# Patient Record
Sex: FEMALE | Race: WHITE
[De-identification: names, ages, dates, MRNs, and addresses within clinical notes are randomized per-mention and may not be internally consistent; named-entity substitution may affect disease eponyms.]

---

## 2017-07-06 ENCOUNTER — HOSPITAL (OUTPATIENT)
Dept: OTHER | Age: 22
End: 2017-07-06
Attending: INTERNAL MEDICINE

## 2017-11-30 ENCOUNTER — HOSPITAL (OUTPATIENT)
Dept: OTHER | Age: 22
End: 2017-11-30
Attending: EMERGENCY MEDICINE

## 2017-11-30 LAB
CHLAMYDIA PROBE: NEGATIVE
CHLAMYDIA/GC SOURCE: NORMAL
CONTROL LINE: PRESENT
CONTROL LINE: PRESENT
GC PROBE: NEGATIVE
Lab: CLEAR
Lab: CLEAR
N GON SOURCE: NORMAL
TRICH POC: NORMAL
URINE PREG POC: NEGATIVE

## 2018-02-28 ENCOUNTER — HOSPITAL (OUTPATIENT)
Dept: OTHER | Age: 23
End: 2018-02-28
Attending: FAMILY MEDICINE

## 2018-02-28 LAB
UA APPEAR POC: CLEAR
UA BILI POC: NEGATIVE
UA BLOOD POC: ABNORMAL
UA COLOR POC: YELLOW
UA GLUCOSE POC: NEGATIVE
UA KETONES POC: NEGATIVE
UA LEUK EST POC: ABNORMAL
UA NITRITE POC: NEGATIVE
UA PH POC: 5.5 (ref 5–7)
UA PROTEIN POC: NEGATIVE
UA SPEC GRAV POC: 1 (ref 1.01–1.02)
UA UROBILIN POC: 0.2 MG/DL

## 2018-03-06 ENCOUNTER — HOSPITAL (OUTPATIENT)
Dept: OTHER | Age: 23
End: 2018-03-06
Attending: INTERNAL MEDICINE

## 2018-03-06 LAB
CONTROL LINE: PRESENT
Lab: CLEAR
UA APPEAR POC: CLEAR
UA BILI POC: NEGATIVE
UA BLOOD POC: ABNORMAL
UA COLOR POC: YELLOW
UA GLUCOSE POC: NEGATIVE
UA KETONES POC: NEGATIVE
UA LEUK EST POC: NEGATIVE
UA NITRITE POC: NEGATIVE
UA PH POC: 5.5 (ref 5–7)
UA PROTEIN POC: NEGATIVE
UA SPEC GRAV POC: 1 (ref 1.01–1.02)
UA UROBILIN POC: 0.2 MG/DL
URINE PREG POC: NEGATIVE

## 2018-04-09 ENCOUNTER — HOSPITAL (OUTPATIENT)
Dept: OTHER | Age: 23
End: 2018-04-09
Attending: FAMILY MEDICINE

## 2018-04-09 LAB
CONTROL LINE: PRESENT
Lab: CLEAR
UA APPEAR POC: CLEAR
UA BILI POC: NEGATIVE
UA BLOOD POC: ABNORMAL
UA COLOR POC: ABNORMAL
UA GLUCOSE POC: ABNORMAL
UA KETONES POC: NEGATIVE
UA LEUK EST POC: NEGATIVE
UA NITRITE POC: POSITIVE
UA PH POC: 6.5 (ref 5–7)
UA PROTEIN POC: ABNORMAL
UA SPEC GRAV POC: 1.01 (ref 1.01–1.02)
UA UROBILIN POC: 1 MG/DL
URINE PREG POC: NEGATIVE

## 2018-04-20 ENCOUNTER — HOSPITAL (OUTPATIENT)
Dept: OTHER | Age: 23
End: 2018-04-20
Attending: UROLOGY

## 2018-05-12 LAB
ANION GAP SERPL CALC-SCNC: 13 MEQ/L (ref 10–20)
BUN SERPL-MCNC: 8 MG/DL (ref 6–20)
CALCIUM: 10.1 MG/DL (ref 8.4–10.2)
CHLORIDE: 104 MEQ/L (ref 97–107)
CREATININE: 0.82 MG/DL (ref 0.6–1.3)
GLUCOSE LVL: 85 MG/DL (ref 70–99)
HEMOLYSIS 2+: NORMAL
POTASSIUM: 4.1 MEQ/L (ref 3.5–5.1)
SODIUM: 138 MEQ/L (ref 136–145)
TCO2: 25 MEQ/L (ref 19–29)

## 2018-05-15 ENCOUNTER — HOSPITAL (OUTPATIENT)
Dept: OTHER | Age: 23
End: 2018-05-15
Attending: UROLOGY

## 2018-05-15 LAB
CONTROL LINE: PRESENT
Lab: CLEAR
URINE PREG POC: NEGATIVE

## 2018-12-08 ENCOUNTER — HOSPITAL ENCOUNTER (OUTPATIENT)
Dept: ULTRASOUND IMAGING | Age: 23
Discharge: HOME | End: 2018-12-08
Attending: INTERNAL MEDICINE
Payer: COMMERCIAL

## 2018-12-08 DIAGNOSIS — E28.2 PCO (POLYCYSTIC OVARIES): ICD-10-CM

## 2018-12-08 PROCEDURE — 76830 TRANSVAGINAL US NON-OB: CPT | Performed by: INTERNAL MEDICINE

## 2018-12-08 PROCEDURE — 76856 US EXAM PELVIC COMPLETE: CPT | Performed by: INTERNAL MEDICINE

## 2019-01-21 ENCOUNTER — OFFICE VISIT (OUTPATIENT)
Dept: FAMILY MEDICINE CLINIC | Facility: CLINIC | Age: 24
End: 2019-01-21
Payer: COMMERCIAL

## 2019-01-21 VITALS
HEIGHT: 64 IN | DIASTOLIC BLOOD PRESSURE: 74 MMHG | BODY MASS INDEX: 22.77 KG/M2 | HEART RATE: 64 BPM | SYSTOLIC BLOOD PRESSURE: 109 MMHG | WEIGHT: 133.38 LBS

## 2019-01-21 DIAGNOSIS — E28.2 PCOS (POLYCYSTIC OVARIAN SYNDROME): ICD-10-CM

## 2019-01-21 DIAGNOSIS — Z00.00 ROUTINE MEDICAL EXAM: Primary | ICD-10-CM

## 2019-01-21 PROCEDURE — 99385 PREV VISIT NEW AGE 18-39: CPT | Performed by: FAMILY MEDICINE

## 2019-01-21 RX ORDER — SPIRONOLACTONE 100 MG/1
1 TABLET, FILM COATED ORAL DAILY
Refills: 1 | COMMUNITY
Start: 2018-12-18

## 2019-01-21 RX ORDER — FLUOXETINE 10 MG/1
1 TABLET, FILM COATED ORAL DAILY
Refills: 2 | COMMUNITY
Start: 2019-01-02

## 2019-01-21 NOTE — PROGRESS NOTES
HPI:   Gal Clark is a 20year old female who presents for a complete physical exam.    Has diagnosis of PCOS - sees endocrinologist. Leisa Fat on it for over a year. Has menses more regularly. LMP 1/14/2019.    Taking fluoxetine for week before and during for or itching,irregular menses  MUSCULOSKELETAL: denies back pain  NEURO: denies headaches  PSYCHE: denies depression or anxiety  HEMATOLOGIC: denies hx of anemia or easy bruising  ENDOCRINE: denies weight changes  ALL/ASTHMA: denies hx of allergy or asthma

## 2019-03-16 ENCOUNTER — TELEPHONE (OUTPATIENT)
Dept: OTHER | Age: 24
End: 2019-03-16

## 2019-03-16 DIAGNOSIS — Z00.00 ROUTINE GENERAL MEDICAL EXAMINATION AT A HEALTH CARE FACILITY: Primary | ICD-10-CM

## 2019-03-16 NOTE — TELEPHONE ENCOUNTER
Pt states she needs to have lab work done, ordered at 1/21/19 OV. Pt would like to have orders for [facility], she states that lab is closer to her.  Informed pt orders will be rewritten for [facility].

## 2019-03-25 LAB
% SATURATION: 10 % (CALC) (ref 11–50)
ABSOLUTE BASOPHILS: 32 CELLS/UL (ref 0–200)
ABSOLUTE EOSINOPHILS: 102 CELLS/UL (ref 15–500)
ABSOLUTE LYMPHOCYTES: 3053 CELLS/UL (ref 850–3900)
ABSOLUTE MONOCYTES: 602 CELLS/UL (ref 200–950)
ABSOLUTE NEUTROPHILS: 2611 CELLS/UL (ref 1500–7800)
ALBUMIN/GLOBULIN RATIO: 1.6 (CALC) (ref 1–2.5)
ALBUMIN: 4.6 G/DL (ref 3.6–5.1)
ALKALINE PHOSPHATASE: 81 U/L (ref 33–115)
ALT: 13 U/L (ref 6–29)
APPEARANCE: CLEAR
AST: 18 U/L (ref 10–30)
BASOPHILS: 0.5 %
BILIRUBIN, TOTAL: 0.3 MG/DL (ref 0.2–1.2)
BILIRUBIN: NEGATIVE
BUN: 11 MG/DL (ref 7–25)
CALCIUM: 9.2 MG/DL (ref 8.6–10.2)
CARBON DIOXIDE: 25 MMOL/L (ref 20–32)
CHLORIDE: 104 MMOL/L (ref 98–110)
CHOL/HDLC RATIO: 2.5 (CALC)
CHOLESTEROL, TOTAL: 158 MG/DL
COLOR: YELLOW
CREATININE: 0.85 MG/DL (ref 0.5–1.1)
EGFR IF AFRICN AM: 110 ML/MIN/1.73M2
EGFR IF NONAFRICN AM: 95 ML/MIN/1.73M2
EOSINOPHILS: 1.6 %
GLOBULIN: 2.9 G/DL (CALC) (ref 1.9–3.7)
GLUCOSE: 78 MG/DL (ref 65–99)
GLUCOSE: NEGATIVE
HDL CHOLESTEROL: 63 MG/DL
HEMATOCRIT: 39.2 % (ref 35–45)
HEMOGLOBIN: 13.2 G/DL (ref 11.7–15.5)
IRON BINDING CAPACITY: 404 MCG/DL (CALC) (ref 250–450)
IRON, TOTAL: 41 MCG/DL (ref 40–190)
KETONES: NEGATIVE
LDL-CHOLESTEROL: 78 MG/DL (CALC)
LYMPHOCYTES: 47.7 %
MCH: 29.5 PG (ref 27–33)
MCHC: 33.7 G/DL (ref 32–36)
MCV: 87.7 FL (ref 80–100)
MONOCYTES: 9.4 %
MPV: 10.3 FL (ref 7.5–12.5)
NEUTROPHILS: 40.8 %
NITRITE: NEGATIVE
NON-HDL CHOLESTEROL: 95 MG/DL (CALC)
OCCULT BLOOD: NEGATIVE
PH: 7.5 (ref 5–8)
PLATELET COUNT: 312 THOUSAND/UL (ref 140–400)
POTASSIUM: 4.3 MMOL/L (ref 3.5–5.3)
PROTEIN, TOTAL: 7.5 G/DL (ref 6.1–8.1)
PROTEIN: NEGATIVE
RDW: 12.2 % (ref 11–15)
RED BLOOD CELL COUNT: 4.47 MILLION/UL (ref 3.8–5.1)
SODIUM: 136 MMOL/L (ref 135–146)
SPECIFIC GRAVITY: 1 (ref 1–1.03)
TRIGLYCERIDES: 89 MG/DL
VITAMIN B12: 301 PG/ML (ref 200–1100)
VITAMIN D, 25-OH, TOTAL: 43 NG/ML (ref 30–100)
WHITE BLOOD CELL COUNT: 6.4 THOUSAND/UL (ref 3.8–10.8)

## 2019-04-01 NOTE — PROGRESS NOTES
[facility] - Overall the labs looked great. Urine was normal - no blood this time.  Cholesterol and vitamin levels were normal. - Dr. Randall Ferro

## 2019-10-10 ENCOUNTER — OFFICE VISIT (OUTPATIENT)
Dept: FAMILY MEDICINE CLINIC | Facility: CLINIC | Age: 24
End: 2019-10-10
Payer: COMMERCIAL

## 2019-10-10 VITALS
WEIGHT: 133 LBS | DIASTOLIC BLOOD PRESSURE: 71 MMHG | HEIGHT: 64 IN | SYSTOLIC BLOOD PRESSURE: 109 MMHG | HEART RATE: 55 BPM | BODY MASS INDEX: 22.71 KG/M2

## 2019-10-10 DIAGNOSIS — Z29.8 ALTITUDE SICKNESS PREVENTATIVE MEASURES: Primary | ICD-10-CM

## 2019-10-10 PROCEDURE — 99213 OFFICE O/P EST LOW 20 MIN: CPT | Performed by: FAMILY MEDICINE

## 2019-10-10 RX ORDER — ACETAZOLAMIDE 125 MG/1
125 TABLET ORAL 2 TIMES DAILY
Qty: 20 TABLET | Refills: 0 | Status: SHIPPED | OUTPATIENT
Start: 2019-10-10 | End: 2021-02-17

## 2019-10-10 NOTE — PROGRESS NOTES
Debra Meadows is a 20year old female. Patient presents with:  Motion Sickness: medication     HPI:   Traveling to [facility]. Would like medication to prevent altitude sickness and possible medication for motion sickness. Will be on small place. Had flu shot.

## 2020-07-31 ENCOUNTER — TELEPHONE (OUTPATIENT)
Dept: FAMILY MEDICINE | Age: 25
End: 2020-07-31

## 2020-09-16 ENCOUNTER — HOSPITAL (OUTPATIENT)
Dept: OTHER | Age: 25
End: 2020-09-16
Attending: PHYSICIAN ASSISTANT

## 2020-09-16 LAB
CONTROL LINE: PRESENT
Lab: CLEAR
UA APPEAR POC: ABNORMAL
UA APPEAR POC: ABNORMAL
UA BILI POC: ABNORMAL
UA BILI POC: ABNORMAL
UA BLOOD POC: NEGATIVE
UA BLOOD POC: NEGATIVE
UA COLOR POC: ABNORMAL
UA COLOR POC: ABNORMAL
UA GLUCOSE POC: ABNORMAL
UA GLUCOSE POC: ABNORMAL
UA KETONES POC: ABNORMAL
UA KETONES POC: ABNORMAL
UA LEUK EST POC: ABNORMAL
UA LEUK EST POC: ABNORMAL
UA NITRITE POC: POSITIVE
UA NITRITE POC: POSITIVE
UA PH POC: 5 (ref 5–7)
UA PH POC: 5 (ref 5–7)
UA PROTEIN POC: ABNORMAL
UA PROTEIN POC: ABNORMAL
UA SPEC GRAV POC: 1.01 (ref 1.01–1.02)
UA SPEC GRAV POC: 1.01 (ref 1.01–1.02)
UA UROBILIN POC: 2 MG/DL
UA UROBILIN POC: 2 MG/DL
URINE PREG POC: NEGATIVE
URINE PREG POC: NEGATIVE

## 2021-01-21 ENCOUNTER — MED REC SCAN ONLY (OUTPATIENT)
Dept: FAMILY MEDICINE CLINIC | Facility: CLINIC | Age: 26
End: 2021-01-21

## 2021-02-17 ENCOUNTER — OFFICE VISIT (OUTPATIENT)
Dept: FAMILY MEDICINE CLINIC | Facility: CLINIC | Age: 26
End: 2021-02-17
Payer: COMMERCIAL

## 2021-02-17 VITALS
WEIGHT: 132.63 LBS | HEIGHT: 64 IN | BODY MASS INDEX: 22.64 KG/M2 | SYSTOLIC BLOOD PRESSURE: 116 MMHG | TEMPERATURE: 97 F | HEART RATE: 60 BPM | DIASTOLIC BLOOD PRESSURE: 72 MMHG

## 2021-02-17 DIAGNOSIS — Z00.00 ROUTINE MEDICAL EXAM: Primary | ICD-10-CM

## 2021-02-17 DIAGNOSIS — N39.0 CHRONIC UTI: ICD-10-CM

## 2021-02-17 DIAGNOSIS — Z78.9 ENGAGES IN TRAVEL ABROAD: ICD-10-CM

## 2021-02-17 PROCEDURE — 90686 IIV4 VACC NO PRSV 0.5 ML IM: CPT | Performed by: FAMILY MEDICINE

## 2021-02-17 PROCEDURE — 99395 PREV VISIT EST AGE 18-39: CPT | Performed by: FAMILY MEDICINE

## 2021-02-17 PROCEDURE — 3074F SYST BP LT 130 MM HG: CPT | Performed by: FAMILY MEDICINE

## 2021-02-17 PROCEDURE — 3008F BODY MASS INDEX DOCD: CPT | Performed by: FAMILY MEDICINE

## 2021-02-17 PROCEDURE — 90471 IMMUNIZATION ADMIN: CPT | Performed by: FAMILY MEDICINE

## 2021-02-17 PROCEDURE — 3078F DIAST BP <80 MM HG: CPT | Performed by: FAMILY MEDICINE

## 2021-02-17 RX ORDER — SULFAMETHOXAZOLE AND TRIMETHOPRIM 400; 80 MG/1; MG/1
TABLET ORAL
Qty: 20 TABLET | Refills: 1 | Status: SHIPPED | OUTPATIENT
Start: 2021-02-17

## 2021-02-17 RX ORDER — ACETAZOLAMIDE 125 MG/1
125 TABLET ORAL 2 TIMES DAILY
Qty: 20 TABLET | Refills: 0 | Status: SHIPPED | OUTPATIENT
Start: 2021-02-17 | End: 2021-02-27

## 2021-02-18 NOTE — PROGRESS NOTES
HPI:   Ronal Guzmán is a 30year old female who presents for a complete physical exam.    Going to [facility] at end of July. Would like renewal of diomax. Wanted to review her past vaccines. Had cystoscopy for chronic UTI. Had one recently. No blood.  Was g 97.4 °F (36.3 °C) (Temporal)   Ht 5' 4\" (1.626 m)   Wt 132 lb 9.6 oz (60.1 kg)   LMP 01/16/2021 (Approximate)   BMI 22.76 kg/m²     GENERAL: well developed, well nourished,in no apparent distress  SKIN: no rashes,no suspicious lesions  HEENT: atraumatic,

## 2021-07-22 ENCOUNTER — MED REC SCAN ONLY (OUTPATIENT)
Dept: FAMILY MEDICINE CLINIC | Facility: CLINIC | Age: 26
End: 2021-07-22

## 2021-08-16 ENCOUNTER — OFFICE VISIT (OUTPATIENT)
Dept: FAMILY MEDICINE CLINIC | Facility: CLINIC | Age: 26
End: 2021-08-16
Payer: COMMERCIAL

## 2021-08-16 VITALS
DIASTOLIC BLOOD PRESSURE: 55 MMHG | SYSTOLIC BLOOD PRESSURE: 90 MMHG | TEMPERATURE: 99 F | HEIGHT: 64 IN | WEIGHT: 129 LBS | HEART RATE: 71 BPM | BODY MASS INDEX: 22.02 KG/M2

## 2021-08-16 DIAGNOSIS — R30.0 DYSURIA: ICD-10-CM

## 2021-08-16 DIAGNOSIS — N30.01 ACUTE CYSTITIS WITH HEMATURIA: Primary | ICD-10-CM

## 2021-08-16 DIAGNOSIS — R35.0 URINARY FREQUENCY: ICD-10-CM

## 2021-08-16 LAB
BILIRUBIN: NEGATIVE
GLUCOSE (URINE DIPSTICK): 100 MG/DL
MULTISTIX LOT#: ABNORMAL NUMERIC
NITRITE, URINE: POSITIVE
PH, URINE: 5 (ref 4.5–8)
PROTEIN (URINE DIPSTICK): 100 MG/DL
SPECIFIC GRAVITY: 1.01 (ref 1–1.03)
UROBILINOGEN,SEMI-QN: 2 MG/DL (ref 0–1.9)

## 2021-08-16 PROCEDURE — 99214 OFFICE O/P EST MOD 30 MIN: CPT | Performed by: NURSE PRACTITIONER

## 2021-08-16 PROCEDURE — 3078F DIAST BP <80 MM HG: CPT | Performed by: NURSE PRACTITIONER

## 2021-08-16 PROCEDURE — 81003 URINALYSIS AUTO W/O SCOPE: CPT | Performed by: NURSE PRACTITIONER

## 2021-08-16 PROCEDURE — 3008F BODY MASS INDEX DOCD: CPT | Performed by: NURSE PRACTITIONER

## 2021-08-16 PROCEDURE — 3074F SYST BP LT 130 MM HG: CPT | Performed by: NURSE PRACTITIONER

## 2021-08-16 RX ORDER — NITROFURANTOIN 25; 75 MG/1; MG/1
100 CAPSULE ORAL 2 TIMES DAILY
Qty: 14 CAPSULE | Refills: 0 | Status: SHIPPED | OUTPATIENT
Start: 2021-08-16 | End: 2022-01-20

## 2021-08-16 NOTE — PROGRESS NOTES
HPI    Patient presents for urinary frequency and dysuria x 2 days. With a history of chronic uti. Take bactrim post coital for uti prophylaxis. Last dose was 2-3 days ago. Review of Systems   Genitourinary: Positive for dysuria and frequency.    Al Lack of Transportation (Non-Medical):   Physical Activity:       Days of Exercise per Week:       Minutes of Exercise per Session:   Stress:       Feeling of Stress :   Social Connections:       Frequency of Communication with Friends and Family:       Dionte Thought Content:  Thought content normal.         Judgment: Judgment normal.          Assessment and Plan:  Problem List Items Addressed This Visit     None      Visit Diagnoses     Dysuria        Relevant Orders    URINALYSIS, AUTO, W/O SCOPE    Urinary fr

## 2022-01-20 ENCOUNTER — OFFICE VISIT (OUTPATIENT)
Dept: FAMILY MEDICINE CLINIC | Facility: CLINIC | Age: 27
End: 2022-01-20
Payer: COMMERCIAL

## 2022-01-20 VITALS
HEIGHT: 64 IN | WEIGHT: 140.38 LBS | SYSTOLIC BLOOD PRESSURE: 100 MMHG | HEART RATE: 67 BPM | DIASTOLIC BLOOD PRESSURE: 62 MMHG | TEMPERATURE: 98 F | BODY MASS INDEX: 23.97 KG/M2

## 2022-01-20 DIAGNOSIS — Z00.00 ROUTINE MEDICAL EXAM: Primary | ICD-10-CM

## 2022-01-20 DIAGNOSIS — E28.2 PCOS (POLYCYSTIC OVARIAN SYNDROME): ICD-10-CM

## 2022-01-20 PROCEDURE — 3078F DIAST BP <80 MM HG: CPT | Performed by: FAMILY MEDICINE

## 2022-01-20 PROCEDURE — 99395 PREV VISIT EST AGE 18-39: CPT | Performed by: FAMILY MEDICINE

## 2022-01-20 PROCEDURE — 3074F SYST BP LT 130 MM HG: CPT | Performed by: FAMILY MEDICINE

## 2022-01-20 PROCEDURE — 3008F BODY MASS INDEX DOCD: CPT | Performed by: FAMILY MEDICINE

## 2022-01-20 NOTE — PROGRESS NOTES
HPI:   Doroteo Rosales is a 27year old female who presents for a complete physical exam.    Sees Dr. Erick Osman for PCOS. Treating. Takes spironolactone every other day. Menses is more regular with it. Exercising now but did gain weight.     Has separ depression pos anxiety - but stable.    HEMATOLOGIC: denies hx of anemia or easy bruising  ENDOCRINE: denies weight changes  ALL/ASTHMA: denies hx of allergy or asthma    EXAM:   /62   Pulse 67   Temp 98.4 °F (36.9 °C) (Temporal)   Ht 5' 4\" (1.626 m)

## 2022-03-04 ENCOUNTER — LAB ENCOUNTER (OUTPATIENT)
Dept: LAB | Age: 27
End: 2022-03-04
Attending: FAMILY MEDICINE
Payer: COMMERCIAL

## 2022-03-04 DIAGNOSIS — Z00.00 ROUTINE MEDICAL EXAM: ICD-10-CM

## 2022-03-04 DIAGNOSIS — E28.2 POLYCYSTIC OVARIES: Primary | ICD-10-CM

## 2022-03-04 LAB
ALBUMIN SERPL-MCNC: 4.1 G/DL (ref 3.4–5)
ALBUMIN/GLOB SERPL: 1.3 {RATIO} (ref 1–2)
ALP LIVER SERPL-CCNC: 73 U/L
ALT SERPL-CCNC: 22 U/L
ANION GAP SERPL CALC-SCNC: 6 MMOL/L (ref 0–18)
AST SERPL-CCNC: 21 U/L (ref 15–37)
BASOPHILS # BLD AUTO: 0.04 X10(3) UL (ref 0–0.2)
BASOPHILS NFR BLD AUTO: 0.6 %
BILIRUB SERPL-MCNC: 0.5 MG/DL (ref 0.1–2)
BUN BLD-MCNC: 11 MG/DL (ref 7–18)
BUN/CREAT SERPL: 13.3 (ref 10–20)
CALCIUM BLD-MCNC: 9.2 MG/DL (ref 8.5–10.1)
CHLORIDE SERPL-SCNC: 105 MMOL/L (ref 98–112)
CHOLEST SERPL-MCNC: 186 MG/DL (ref ?–200)
CREAT BLD-MCNC: 0.83 MG/DL
DEPRECATED RDW RBC AUTO: 40.1 FL (ref 35.1–46.3)
EOSINOPHIL # BLD AUTO: 0.31 X10(3) UL (ref 0–0.7)
EOSINOPHIL NFR BLD AUTO: 4.4 %
ERYTHROCYTE [DISTWIDTH] IN BLOOD BY AUTOMATED COUNT: 12.9 % (ref 11–15)
EST. AVERAGE GLUCOSE BLD GHB EST-MCNC: 105 MG/DL (ref 68–126)
FASTING PATIENT LIPID ANSWER: YES
FASTING STATUS PATIENT QL REPORTED: YES
GLOBULIN PLAS-MCNC: 3.1 G/DL (ref 2.8–4.4)
GLUCOSE BLD-MCNC: 81 MG/DL (ref 70–99)
HBA1C MFR BLD: 5.3 % (ref ?–5.7)
HCT VFR BLD AUTO: 38.1 %
HDLC SERPL-MCNC: 71 MG/DL (ref 40–59)
HGB BLD-MCNC: 12.4 G/DL
IMM GRANULOCYTES # BLD AUTO: 0.01 X10(3) UL (ref 0–1)
IMM GRANULOCYTES NFR BLD: 0.1 %
LDLC SERPL CALC-MCNC: 100 MG/DL (ref ?–100)
LYMPHOCYTES # BLD AUTO: 2.76 X10(3) UL (ref 1–4)
LYMPHOCYTES NFR BLD AUTO: 38.8 %
MCH RBC QN AUTO: 27.9 PG (ref 26–34)
MCHC RBC AUTO-ENTMCNC: 32.5 G/DL (ref 31–37)
MCV RBC AUTO: 85.8 FL
MONOCYTES # BLD AUTO: 0.71 X10(3) UL (ref 0.1–1)
MONOCYTES NFR BLD AUTO: 10 %
NEUTROPHILS # BLD AUTO: 3.28 X10 (3) UL (ref 1.5–7.7)
NEUTROPHILS # BLD AUTO: 3.28 X10(3) UL (ref 1.5–7.7)
NEUTROPHILS NFR BLD AUTO: 46.1 %
NONHDLC SERPL-MCNC: 115 MG/DL (ref ?–130)
OSMOLALITY SERPL CALC.SUM OF ELEC: 282 MOSM/KG (ref 275–295)
PLATELET # BLD AUTO: 315 10(3)UL (ref 150–450)
POTASSIUM SERPL-SCNC: 4.1 MMOL/L (ref 3.5–5.1)
PROT SERPL-MCNC: 7.2 G/DL (ref 6.4–8.2)
RBC # BLD AUTO: 4.44 X10(6)UL
SODIUM SERPL-SCNC: 137 MMOL/L (ref 136–145)
TRIGL SERPL-MCNC: 84 MG/DL (ref 30–149)
TSI SER-ACNC: 2.12 MIU/ML (ref 0.36–3.74)
VIT B12 SERPL-MCNC: 294 PG/ML (ref 193–986)
VIT D+METAB SERPL-MCNC: 35 NG/ML (ref 30–100)
VLDLC SERPL CALC-MCNC: 14 MG/DL (ref 0–30)
WBC # BLD AUTO: 7.1 X10(3) UL (ref 4–11)

## 2022-03-04 PROCEDURE — 85025 COMPLETE CBC W/AUTO DIFF WBC: CPT

## 2022-03-04 PROCEDURE — 36415 COLL VENOUS BLD VENIPUNCTURE: CPT

## 2022-03-04 PROCEDURE — 82306 VITAMIN D 25 HYDROXY: CPT

## 2022-03-04 PROCEDURE — 80061 LIPID PANEL: CPT

## 2022-03-04 PROCEDURE — 83036 HEMOGLOBIN GLYCOSYLATED A1C: CPT

## 2022-03-04 PROCEDURE — 82607 VITAMIN B-12: CPT

## 2022-03-04 PROCEDURE — 80053 COMPREHEN METABOLIC PANEL: CPT

## 2022-03-04 PROCEDURE — 84443 ASSAY THYROID STIM HORMONE: CPT

## 2022-03-10 NOTE — PROGRESS NOTES
Luis Fernando Dsouza - Your labs are all normal. Your B12 levels are at the lower end of normal. You can take extra vitamin B12 200-400 mcg daily to boost those levels and maybe your energy. - Dr. Jones Lilly

## 2022-05-06 ENCOUNTER — OFFICE VISIT (OUTPATIENT)
Dept: FAMILY MEDICINE CLINIC | Facility: CLINIC | Age: 27
End: 2022-05-06
Payer: COMMERCIAL

## 2022-05-06 VITALS
TEMPERATURE: 98 F | HEIGHT: 64 IN | DIASTOLIC BLOOD PRESSURE: 74 MMHG | SYSTOLIC BLOOD PRESSURE: 106 MMHG | WEIGHT: 143 LBS | HEART RATE: 76 BPM | BODY MASS INDEX: 24.41 KG/M2

## 2022-05-06 DIAGNOSIS — J01.90 ACUTE SINUSITIS, RECURRENCE NOT SPECIFIED, UNSPECIFIED LOCATION: Primary | ICD-10-CM

## 2022-05-06 DIAGNOSIS — R05.9 COUGH: ICD-10-CM

## 2022-05-06 PROCEDURE — 99213 OFFICE O/P EST LOW 20 MIN: CPT | Performed by: NURSE PRACTITIONER

## 2022-05-06 PROCEDURE — 3078F DIAST BP <80 MM HG: CPT | Performed by: NURSE PRACTITIONER

## 2022-05-06 PROCEDURE — 3074F SYST BP LT 130 MM HG: CPT | Performed by: NURSE PRACTITIONER

## 2022-05-06 PROCEDURE — 3008F BODY MASS INDEX DOCD: CPT | Performed by: NURSE PRACTITIONER

## 2022-05-06 RX ORDER — ALBUTEROL SULFATE 90 UG/1
2 AEROSOL, METERED RESPIRATORY (INHALATION) EVERY 4 HOURS PRN
Qty: 18 G | Refills: 0 | Status: SHIPPED | OUTPATIENT
Start: 2022-05-06

## 2022-05-06 RX ORDER — PREDNISONE 20 MG/1
TABLET ORAL
Qty: 10 TABLET | Refills: 0 | Status: SHIPPED | OUTPATIENT
Start: 2022-05-06

## 2022-05-06 RX ORDER — AMOXICILLIN AND CLAVULANATE POTASSIUM 875; 125 MG/1; MG/1
1 TABLET, FILM COATED ORAL 2 TIMES DAILY
Qty: 20 TABLET | Refills: 0 | Status: SHIPPED | OUTPATIENT
Start: 2022-05-06 | End: 2022-05-16

## 2022-05-12 ENCOUNTER — PATIENT MESSAGE (OUTPATIENT)
Dept: FAMILY MEDICINE CLINIC | Facility: CLINIC | Age: 27
End: 2022-05-12

## 2022-05-13 ENCOUNTER — TELEPHONE (OUTPATIENT)
Dept: FAMILY MEDICINE CLINIC | Facility: CLINIC | Age: 27
End: 2022-05-13

## 2022-05-13 NOTE — TELEPHONE ENCOUNTER
See Condition Update Telephone Encounter. Closing this Encounter. Responded to patient via [employer].

## 2022-05-13 NOTE — TELEPHONE ENCOUNTER
Second Call Attempt. Left Voicemail to call back our office. Office phone number provided with office hours.

## 2022-05-14 NOTE — TELEPHONE ENCOUNTER
Wheezing   Message [phone]  From   Lam Meadows RN To   Pan Ramos and Delivered   5/13/2022 11:50 AM   Last Read in MyChart   5/13/2022 11:59 AM by Leandro Mccallum     Tried calling patient to follow up/triage wheezing. Left message to call back. mychart message also sent.

## 2022-08-30 ENCOUNTER — TELEMEDICINE (OUTPATIENT)
Dept: FAMILY MEDICINE CLINIC | Facility: CLINIC | Age: 27
End: 2022-08-30
Payer: COMMERCIAL

## 2022-08-30 DIAGNOSIS — N30.10 INTERSTITIAL CYSTITIS: Primary | ICD-10-CM

## 2022-08-30 DIAGNOSIS — N30.00 ACUTE CYSTITIS WITHOUT HEMATURIA: ICD-10-CM

## 2022-08-30 PROCEDURE — 99441 PHONE E/M BY PHYS 5-10 MIN: CPT | Performed by: NURSE PRACTITIONER

## 2022-11-21 ENCOUNTER — PATIENT MESSAGE (OUTPATIENT)
Dept: FAMILY MEDICINE CLINIC | Facility: CLINIC | Age: 27
End: 2022-11-21

## 2022-11-21 RX ORDER — SPIRONOLACTONE 100 MG/1
100 TABLET, FILM COATED ORAL DAILY
Qty: 90 TABLET | Refills: 1 | Status: SHIPPED | OUTPATIENT
Start: 2022-11-21

## 2022-11-21 NOTE — TELEPHONE ENCOUNTER
Not previously prescribed by you, patient has been without endocrinologist due to insurance change. States via [email or web address] you are aware she is on these medications for PCOS    Last CMP = 3/4/22      Please review; Protocol Failed / No protocol. Requested Prescriptions   Pending Prescriptions Disp Refills    spironolactone 100 MG Oral Tab  1     Sig: Take 1 tablet (100 mg total) by mouth daily. Hypertensive Medications Protocol Failed - 11/20/2022  5:17 PM        Failed - CMP or BMP in past 6 months     No results found for this or any previous visit (from the past 4392 hour(s)). Passed - In person appointment in the past 12 or next 3 months     Recent Outpatient Visits              2 months ago Interstitial cystitis    [facility], [address], [address] [place],  [facility] [place]    6 months ago Acute sinusitis, recurrence not specified, unspecified location    [facility], [address], [address] [place] [address] Didi Gamez APRN    Office Visit    10 months ago Routine medical exam    [address] Dillon MD    Office Visit    1 year ago Acute cystitis with hematuria    [facility], [address], [address] [place], APRN    Office Visit    1 year ago Routine medical exam    [address] Wade Dillon MD    Office Visit          Future Appointments         Provider Department Appt Notes    Tomorrow CLEMENTINE [facility], [address], [place] Wheezing issue, let [facility] message for patient to make her appt in person.                Passed - Last BP reading less than 140/90     BP Readings from Last 1 Encounters:  05/06/22 : 106/74              Passed - In person appointment or virtual visit in the past 6 months     Recent Outpatient Visits              2 months ago Interstitial cystitis    [facility], [address], [address] [place], [address] [facility] [place]    6 months ago Acute sinusitis, recurrence not specified, unspecified location    [address], [address] [place] [address] Christopher Gamez, CLEMENTINE    Office Visit    10 months ago Routine medical exam    [address] Kimber Dillon MD    Office Visit    1 year ago Acute cystitis with hematuria    [facility], [address], [address] [place], APRN    Office Visit    1 year ago Routine medical exam    [address] Kimber Dillon MD    Office Visit          Future Appointments         Provider Department Appt Notes    Tomorrow Shilpi Rizvi, APRN [address] [facility], [address], [place] Wheezing issue, let [facility] message for patient to make her appt in person. Passed - EGFRCR or GFRNAA > 50     GFR Evaluation  GFRNAA: 96 , resulted on 3/4/2022            metFORMIN 500 MG Oral Tab  0     Sig: Take 1 tablet (500 mg total) by mouth 2 (two) times daily with meals.        Diabetes Medication Protocol Failed - 11/20/2022  5:17 PM        Failed - Last A1C < 7.5 and within past 6 months     Lab Results   Component Value Date    A1C 5.3 03/04/2022             Passed - In person appointment or virtual visit in the past 6 mos or appointment in next 3 mos     Recent Outpatient Visits              2 months ago Interstitial cystitis    [facility], [address], [address]    6 months ago Acute sinusitis, recurrence not specified, unspecified location    [facility], [address], [address] [facility] Christopher Gamez, APRLAURO    Office Visit    10 months ago Routine medical exam    [address] Kimber Dillon MD    Office Visit    1 year ago Acute cystitis with hematuria    [facility], [address], [address] [place], APRN    Office Visit    1 year ago Routine medical exam    [address] Kimber Dillon MD    Office Visit          Future Appointments         Provider Department Appt Notes    Tomorrow Maday Copeland, [address] [facility], [place] Wheezing issue, let  message for patient to make her appt in person. Passed - EGFRCR or GFRNAA > 50     GFR Evaluation  GFRNAA: 96 , resulted on 3/4/2022          Passed - GFR in the past 12 months              Recent Outpatient Visits              2 months ago Interstitial cystitis    [facility], [address], [address], [address] [place] [address]    6 months ago Acute sinusitis, recurrence not specified, unspecified location    [facility], [address], [address] [facility] Didi Gamez, [address]    Office Visit    10 months ago Routine medical exam    [address] Mack Dillon MD    Office Visit    1 year ago Acute cystitis with hematuria    [employer] [facility], [address], [address] [place], [facility]    Office Visit    1 year ago Routine medical exam    [address] Mack Dillon MD    Office Visit             Future Appointments         Provider Department Appt Notes    Tomorrow Claudetta Mings, APRN [address], [address], [place] Wheezing issue, let [facility] message for patient to make her appt in person.

## 2022-11-21 NOTE — TELEPHONE ENCOUNTER
Juanjose explaining initial visit to new provider needs to be in person. As pt resides in [facility] and has no car, encouraged her to establish PCP near home and get to [facility] or  for current issue.

## 2023-08-09 NOTE — TELEPHONE ENCOUNTER
Please review. Protocol failed / No Protocol.     Requested Prescriptions   Pending Prescriptions Disp Refills    METFORMIN 500 MG Oral Tab [Pharmacy Med Name: METFORMIN 500MG TABLETS] 180 tablet 1     Sig: TAKE 1 TABLET(500 MG) BY MOUTH TWICE DAILY WITH MEALS       Diabetes Medication Protocol Failed - 8/8/2023 11:46 AM        Failed - Last A1C < 7.5 and within past 6 months     Lab Results   Component Value Date    A1C 5.3 03/04/2022             Failed - In person appointment or virtual visit in the past 6 mos or appointment in next 3 mos     Recent Outpatient Visits              11 months ago Interstitial cystitis    [facility], [address], APRN    Telemedicine    1 year ago Acute sinusitis, recurrence not specified, unspecified location    Jaime Kwon [address], APRN    Office Visit    1 year ago Routine medical exam    Margie Nichols MD    Office Visit    1 year ago Acute cystitis with hematuria    [facility], [address], [address], APRN    Office Visit    2 years ago Routine medical exam    Margie Nichols MD    Office Visit                      Failed - EGFRCR or GFRNAA > 50     GFR Evaluation            Failed - GFR in the past 12 months

## 2024-06-19 ENCOUNTER — TELEPHONE (OUTPATIENT)
Dept: ENDOCRINOLOGY | Age: 29
End: 2024-06-19

## 2024-06-20 ENCOUNTER — APPOINTMENT (OUTPATIENT)
Dept: ENDOCRINOLOGY | Age: 29
End: 2024-06-20

## 2024-06-20 VITALS
HEART RATE: 65 BPM | TEMPERATURE: 97.6 F | SYSTOLIC BLOOD PRESSURE: 95 MMHG | DIASTOLIC BLOOD PRESSURE: 63 MMHG | BODY MASS INDEX: 22.21 KG/M2 | WEIGHT: 125.33 LBS | HEIGHT: 63 IN | OXYGEN SATURATION: 97 %

## 2024-06-20 DIAGNOSIS — E28.2 PCOS (POLYCYSTIC OVARIAN SYNDROME): Primary | ICD-10-CM

## 2024-06-20 PROCEDURE — 99204 OFFICE O/P NEW MOD 45 MIN: CPT | Performed by: INTERNAL MEDICINE

## 2024-06-20 RX ORDER — ESCITALOPRAM OXALATE 20 MG/1
20 TABLET ORAL DAILY
COMMUNITY
Start: 2024-02-09

## 2024-06-20 RX ORDER — PROPRANOLOL HYDROCHLORIDE 10 MG/1
10 TABLET ORAL
COMMUNITY
Start: 2024-03-04

## 2024-06-20 ASSESSMENT — PATIENT HEALTH QUESTIONNAIRE - PHQ9
2. FEELING DOWN, DEPRESSED OR HOPELESS: SEVERAL DAYS
1. LITTLE INTEREST OR PLEASURE IN DOING THINGS: NOT AT ALL
SUM OF ALL RESPONSES TO PHQ9 QUESTIONS 1 AND 2: 1
CLINICAL INTERPRETATION OF PHQ2 SCORE: NO FURTHER SCREENING NEEDED
SUM OF ALL RESPONSES TO PHQ9 QUESTIONS 1 AND 2: 1

## 2024-07-15 ENCOUNTER — E-ADVICE (OUTPATIENT)
Dept: ENDOCRINOLOGY | Age: 29
End: 2024-07-15

## 2024-07-22 ENCOUNTER — E-ADVICE (OUTPATIENT)
Dept: ENDOCRINOLOGY | Age: 29
End: 2024-07-22

## 2025-01-07 ENCOUNTER — APPOINTMENT (OUTPATIENT)
Dept: ENDOCRINOLOGY | Age: 30
End: 2025-01-07

## (undated) NOTE — LETTER
02/22/19        Phoebe Villar [address]      Dear Sarah Reeder,    [facility] records indicate that you have outstanding lab work and or testing that was ordered for you and has not yet been completed:  Orders Placed This